# Patient Record
(demographics unavailable — no encounter records)

---

## 2025-01-30 NOTE — SOCIAL HISTORY
[House] : [unfilled] lives in a house  [Radiator/Baseboard] : heating provided by radiator(s)/baseboard(s) [Window Units] : air conditioning provided by window units [Dog] : dog [Single] : single [FreeTextEntry1] : Lives with mother - brother - grandparents  [Bedroom] : not in the bedroom [Basement] : not in the basement [Living Area] : not in the living area [de-identified] : mother smokes outside - not around daughter

## 2025-01-30 NOTE — PHYSICAL EXAM
[Boggy Nasal Turbinates] : boggy and/or pale nasal turbinates [No Neck Mass] : no neck mass was observed [No LAD] : no lymphadenopathy [No Thyroid Mass] : no thyroid mass [Normal Rate and Effort] : normal respiratory rhythm and effort [No Crackles] : no crackles [Normal Rate] : heart rate was normal  [Normal S1, S2] : normal S1 and S2 [Regular Rhythm] : with a regular rhythm [Normal Cervical Lymph Nodes] : cervical [Normal Mood] : mood was normal [Normal Affect] : affect was normal [Judgment and Insight Age Appropriate] : judgement and insight is age appropriate [Wheezing] : no wheezing was heard [Dermatographism] : dermatographism

## 2025-01-30 NOTE — IMPRESSION
[Spirometry] : Spirometry [Reversible] : , without reversibility. [Fixed Large Airway Obstruction] : fixed large airway obstruction [FreeTextEntry2] : FENO - 82

## 2025-01-30 NOTE — HISTORY OF PRESENT ILLNESS
[de-identified] : 15 y/o female with a history of asthma since age 7.   She has been admitted to the hospital x 5 - the last was October of 2024.   She was never admitted to ICU or intubated.   She has been treated with prednisone when she is hospitalized.   She has been followed by pulmonary MD.   Her asthma worsens with laughing - exercise - cold air - large dogs - they have one dog at home.    At a friend's home with [employer] she developed worsening of her asthma.   Patient with chronic nasal congestion thru the year.   Present medications:  Advair 500/50 BID - Spirva Respimat QD - nebulizer ipratropium/albuterol BID on a regular basis and albuterol MDI 2x per day.   She will awaken at night with coughing and wheezing - at least 1x per night.     Food allergy:  Based upon blood testing only - advised allergic to milk - egg - tree nuts - shellfish - she eats all of these foods with no reactions.

## 2025-01-30 NOTE — ASSESSMENT
[FreeTextEntry1] : Moderate to severe persistent asthma Perennial allergic rhinitis   Dog and dust mite avoidance reviewed with mother  Patient is an excellent candidate for biologic therapy based upon the severity of her asthma - need for multiple medications to control her asthma - multiple hospitalizations - need for prednisone - overuse of rescue inhaler and nebulizer.    Prednisone 30 mg QD x 5 D Prednisone 20 mg QD x 5 D Prednisone 10 mg QD x 5 D Continue Advair - Spiriva - prn nebulizer  RV 2 weeks

## 2025-02-13 NOTE — SOCIAL HISTORY
[House] : [unfilled] lives in a house  [Radiator/Baseboard] : heating provided by radiator(s)/baseboard(s) [Window Units] : air conditioning provided by window units [Dog] : dog [Single] : single [FreeTextEntry1] : Lives with mother - brother - grandparents  [Bedroom] : not in the bedroom [Basement] : not in the basement [Living Area] : not in the living area [de-identified] : mother smokes outside - not around daughter

## 2025-02-13 NOTE — IMPRESSION
[Spirometry] : Spirometry [Moderate] : (moderate) [Reversible] : , without reversibility. [FreeTextEntry2] : FENO 59

## 2025-02-13 NOTE — ASSESSMENT
[FreeTextEntry1] : Moderate to severe persistent asthma Perennial allergic rhinitis   Dog and dust mite avoidance reviewed with mother  Patient is an excellent candidate for biologic therapy based upon the severity of her asthma - need for multiple medications to control her asthma - multiple hospitalizations - need for prednisone - overuse of rescue inhaler and nebulizer.    Dupixent 600 mg SQ given to patient Continue Advair - Spiriva - prn nebulizer  Mother given name of MD in [place] - they will be moving in 1 month  RV 2 weeks

## 2025-02-13 NOTE — PHYSICAL EXAM
[Boggy Nasal Turbinates] : boggy and/or pale nasal turbinates [No Neck Mass] : no neck mass was observed [No LAD] : no lymphadenopathy [No Thyroid Mass] : no thyroid mass [Normal Rate and Effort] : normal respiratory rhythm and effort [No Crackles] : no crackles [Normal Rate] : heart rate was normal  [Normal S1, S2] : normal S1 and S2 [Regular Rhythm] : with a regular rhythm [Normal Cervical Lymph Nodes] : cervical [Dermatographism] : dermatographism [Normal Mood] : mood was normal [Normal Affect] : affect was normal [Judgment and Insight Age Appropriate] : judgement and insight is age appropriate [Wheezing] : no wheezing was heard

## 2025-02-13 NOTE — HISTORY OF PRESENT ILLNESS
[de-identified] : Patient completed prednisone - taking Advair 500/50 BID - Spiriva and albuterol prn - she reports needing her nebulizer or albuterol 1-2 x per day.

## 2025-02-27 NOTE — ASSESSMENT
[FreeTextEntry1] : Severe persistent asthma much improved with present medical therapy:  Continue Dupixent  Continue Advair 500/50 BID  Continue Spiriva QD Continue albuterol QID prn  RV 6 weeks

## 2025-02-27 NOTE — PHYSICAL EXAM
[Alert] : alert [Well Nourished] : well nourished [No Acute Distress] : no acute distress [Well Developed] : well developed [No Neck Mass] : no neck mass was observed [No LAD] : no lymphadenopathy [Normal Rate and Effort] : normal respiratory rhythm and effort [No Crackles] : no crackles [No Retractions] : no retractions [Normal Rate] : heart rate was normal  [Normal S1, S2] : normal S1 and S2 [Regular Rhythm] : with a regular rhythm [Normal Cervical Lymph Nodes] : cervical [Normal Mood] : mood was normal [Normal Affect] : affect was normal [Judgment and Insight Age Appropriate] : judgement and insight is age appropriate [Alert, Awake, Oriented as Age-Appropriate] : alert, awake, oriented as age appropriate [Wheezing] : no wheezing was heard

## 2025-02-27 NOTE — HISTORY OF PRESENT ILLNESS
[de-identified] : Patient started Dupixent - she is taking Advair 500/50 BID - Spiriva and she reports dramatic improvement in her symptoms -

## 2025-02-27 NOTE — IMPRESSION
[Spirometry] : Spirometry [Mild] : (mild) [Reversible] : , without reversibility. [FreeTextEntry2] : FENO 16